# Patient Record
Sex: MALE | Race: WHITE | Employment: UNEMPLOYED | URBAN - METROPOLITAN AREA
[De-identification: names, ages, dates, MRNs, and addresses within clinical notes are randomized per-mention and may not be internally consistent; named-entity substitution may affect disease eponyms.]

---

## 2021-12-13 ENCOUNTER — HOSPITAL ENCOUNTER (EMERGENCY)
Facility: CLINIC | Age: 21
Discharge: HOME | End: 2021-12-13
Attending: NURSE PRACTITIONER
Payer: COMMERCIAL

## 2021-12-13 VITALS
OXYGEN SATURATION: 97 % | WEIGHT: 140 LBS | HEIGHT: 68 IN | HEART RATE: 63 BPM | TEMPERATURE: 97.2 F | BODY MASS INDEX: 21.22 KG/M2 | RESPIRATION RATE: 16 BRPM

## 2021-12-13 ASSESSMENT — MIFFLIN-ST. JEOR: SCORE: 1619.54

## 2021-12-13 NOTE — ED NOTES
"Pt originally stated he would like to be seen in ED to have his finger stitched. Discussed option of UC for this injury and patient states, \"I want to be seen wherever I can that will be the quickest\"   "